# Patient Record
Sex: MALE | Race: WHITE | NOT HISPANIC OR LATINO | URBAN - METROPOLITAN AREA
[De-identification: names, ages, dates, MRNs, and addresses within clinical notes are randomized per-mention and may not be internally consistent; named-entity substitution may affect disease eponyms.]

---

## 2017-09-28 ENCOUNTER — EMERGENCY (EMERGENCY)
Facility: HOSPITAL | Age: 27
LOS: 1 days | Discharge: HOME | End: 2017-09-28
Attending: EMERGENCY MEDICINE | Admitting: EMERGENCY MEDICINE
Payer: COMMERCIAL

## 2017-09-28 VITALS
TEMPERATURE: 98 F | RESPIRATION RATE: 16 BRPM | OXYGEN SATURATION: 98 % | HEART RATE: 135 BPM | SYSTOLIC BLOOD PRESSURE: 122 MMHG | DIASTOLIC BLOOD PRESSURE: 90 MMHG

## 2017-09-28 VITALS
HEART RATE: 98 BPM | SYSTOLIC BLOOD PRESSURE: 131 MMHG | TEMPERATURE: 98 F | RESPIRATION RATE: 16 BRPM | DIASTOLIC BLOOD PRESSURE: 93 MMHG | OXYGEN SATURATION: 98 %

## 2017-09-28 DIAGNOSIS — S02.2XXA FRACTURE OF NASAL BONES, INITIAL ENCOUNTER FOR CLOSED FRACTURE: ICD-10-CM

## 2017-09-28 DIAGNOSIS — R04.0 EPISTAXIS: ICD-10-CM

## 2017-09-28 PROCEDURE — 99284 EMERGENCY DEPT VISIT MOD MDM: CPT

## 2017-09-28 PROCEDURE — 70486 CT MAXILLOFACIAL W/O DYE: CPT | Mod: 26

## 2017-09-28 RX ORDER — OXYCODONE HYDROCHLORIDE 5 MG/1
1 TABLET ORAL
Qty: 12 | Refills: 0 | OUTPATIENT
Start: 2017-09-28 | End: 2017-10-01
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
## 2017-09-28 NOTE — ED PROVIDER NOTE - PROGRESS NOTE DETAILS
Bilateral nasal bone fx. Accidental finding of thyroid goiter, will explain to patient in detail. Will refer to plastic surgery for definitive care. Will use Dermabond for superficial nasal bridge abrasion. Bilateral nasal bone fx. Accidental finding of thyroid goiter (+ fam hx but without symptoms), will explain to patient in detail. Will refer to plastic surgery for definitive care. Will use Dermabond for superficial nasal bridge abrasion.

## 2017-09-28 NOTE — ED PROVIDER NOTE - ENMT, MLM
Airway patent, No tender facial bones. Deformity of the nasal bridge with horizontal superficial linear abrasion. Dried blood in both nares, no active bleeding.
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
## 2017-10-02 NOTE — ED POST DISCHARGE NOTE - OTHER COMMUNICATION
Spoke with patient after he confirmed his identify and provided him with a copy of his CT after he signed his [employer] release form
